# Patient Record
Sex: MALE | URBAN - METROPOLITAN AREA
[De-identification: names, ages, dates, MRNs, and addresses within clinical notes are randomized per-mention and may not be internally consistent; named-entity substitution may affect disease eponyms.]

---

## 2020-10-15 ENCOUNTER — OFFICE VISIT (OUTPATIENT)
Dept: URBAN - METROPOLITAN AREA CLINIC 105 | Facility: CLINIC | Age: 35
End: 2020-10-15

## 2020-10-21 ENCOUNTER — LAB OUTSIDE AN ENCOUNTER (OUTPATIENT)
Dept: URBAN - METROPOLITAN AREA CLINIC 124 | Facility: CLINIC | Age: 35
End: 2020-10-21

## 2020-10-21 ENCOUNTER — OFFICE VISIT (OUTPATIENT)
Dept: URBAN - METROPOLITAN AREA CLINIC 124 | Facility: CLINIC | Age: 35
End: 2020-10-21
Payer: COMMERCIAL

## 2020-10-21 DIAGNOSIS — R19.7 DIARRHEA: ICD-10-CM

## 2020-10-21 DIAGNOSIS — R07.89 ATYPICAL CHEST PAIN: ICD-10-CM

## 2020-10-21 DIAGNOSIS — K58.9 IBS (IRRITABLE BOWEL SYNDROME)-DIARRHEA: ICD-10-CM

## 2020-10-21 DIAGNOSIS — R19.4 CHANGE IN BOWEL HABITS: ICD-10-CM

## 2020-10-21 DIAGNOSIS — R12 HEARTBURN: ICD-10-CM

## 2020-10-21 PROCEDURE — G8427 DOCREV CUR MEDS BY ELIG CLIN: HCPCS | Performed by: INTERNAL MEDICINE

## 2020-10-21 PROCEDURE — G9903 PT SCRN TBCO ID AS NON USER: HCPCS | Performed by: INTERNAL MEDICINE

## 2020-10-21 PROCEDURE — 99204 OFFICE O/P NEW MOD 45 MIN: CPT | Performed by: INTERNAL MEDICINE

## 2020-10-21 PROCEDURE — G8420 CALC BMI NORM PARAMETERS: HCPCS | Performed by: INTERNAL MEDICINE

## 2020-10-21 PROCEDURE — G8482 FLU IMMUNIZE ORDER/ADMIN: HCPCS | Performed by: INTERNAL MEDICINE

## 2020-10-21 RX ORDER — FAMOTIDINE 40 MG/1
1 TABLET AT BEDTIME TABLET, FILM COATED ORAL ONCE A DAY
Qty: 90 | Refills: 1 | OUTPATIENT
Start: 2020-10-21

## 2020-10-21 RX ORDER — RIFAXIMIN 550 MG/1
1 TABLET TABLET ORAL THREE TIMES A DAY
Qty: 42 TABLET | Refills: 0 | OUTPATIENT
Start: 2020-10-21

## 2020-10-21 NOTE — HPI-TODAY'S VISIT:
Oct 2020: hx hemorrhoid banding by dr driscoll - multiple sessions, most recently 1 yr ago. also had colonoscopy 3 yrs ago. chronic GI issues.  Labs 8/5/20 - CBC, CMP, lipid panel, TSH all normal.  Diarrhea X 3 months. this is new. intermittent. loose stools. 1-2 BM per day. usually constipated in past - has tried trulance which was causing diarrhea, also tried amitiza in past. has tried bentyl, levsin SL, amitryptyline. Had stool tests done by dr driscoll which were normal.  No family history of colon cancer / GI malignancies / IBD.  severe heartburn X 3 weeks ago after eating mexican food. excessive salivation. Started doxycyline X recently but off - on since 15 yrs.

## 2020-10-22 ENCOUNTER — LAB OUTSIDE AN ENCOUNTER (OUTPATIENT)
Dept: URBAN - METROPOLITAN AREA CLINIC 25 | Facility: CLINIC | Age: 35
End: 2020-10-22

## 2020-10-26 ENCOUNTER — TELEPHONE ENCOUNTER (OUTPATIENT)
Dept: URBAN - METROPOLITAN AREA CLINIC 92 | Facility: CLINIC | Age: 35
End: 2020-10-26

## 2020-10-26 LAB
CALPROTECTIN, STOOL - QDX: (no result)
FECAL FAT, QUALITATIVE: (no result)
GASTROINTESTINAL PATHOGEN: (no result)
PANCREATICELASTASE ELISA, STOOL: (no result)

## 2020-10-30 ENCOUNTER — TELEPHONE ENCOUNTER (OUTPATIENT)
Dept: URBAN - METROPOLITAN AREA CLINIC 25 | Facility: CLINIC | Age: 35
End: 2020-10-30

## 2020-11-11 ENCOUNTER — OFFICE VISIT (OUTPATIENT)
Dept: URBAN - METROPOLITAN AREA SURGERY CENTER 16 | Facility: SURGERY CENTER | Age: 35
End: 2020-11-11

## 2020-12-09 ENCOUNTER — OFFICE VISIT (OUTPATIENT)
Dept: URBAN - METROPOLITAN AREA SURGERY CENTER 16 | Facility: SURGERY CENTER | Age: 35
End: 2020-12-09
Payer: COMMERCIAL

## 2020-12-09 ENCOUNTER — CLAIMS CREATED FROM THE CLAIM WINDOW (OUTPATIENT)
Dept: URBAN - METROPOLITAN AREA CLINIC 4 | Facility: CLINIC | Age: 35
End: 2020-12-09
Payer: COMMERCIAL

## 2020-12-09 DIAGNOSIS — K63.89 OTHER SPECIFIED DISEASES OF INTESTINE: ICD-10-CM

## 2020-12-09 DIAGNOSIS — R19.7 ACUTE DIARRHEA: ICD-10-CM

## 2020-12-09 PROCEDURE — 88342 IMHCHEM/IMCYTCHM 1ST ANTB: CPT | Performed by: PATHOLOGY

## 2020-12-09 PROCEDURE — 88305 TISSUE EXAM BY PATHOLOGIST: CPT | Performed by: PATHOLOGY

## 2020-12-09 PROCEDURE — 45380 COLONOSCOPY AND BIOPSY: CPT | Performed by: INTERNAL MEDICINE

## 2020-12-09 PROCEDURE — 88313 SPECIAL STAINS GROUP 2: CPT | Performed by: PATHOLOGY

## 2020-12-09 PROCEDURE — G9937 DIG OR SURV COLSCO: HCPCS | Performed by: INTERNAL MEDICINE

## 2020-12-09 PROCEDURE — G8907 PT DOC NO EVENTS ON DISCHARG: HCPCS | Performed by: INTERNAL MEDICINE

## 2020-12-09 RX ORDER — RIFAXIMIN 550 MG/1
1 TABLET TABLET ORAL THREE TIMES A DAY
Qty: 42 TABLET | Refills: 0 | Status: ACTIVE | COMMUNITY
Start: 2020-10-21

## 2020-12-09 RX ORDER — FAMOTIDINE 40 MG/1
1 TABLET AT BEDTIME TABLET, FILM COATED ORAL ONCE A DAY
Qty: 90 | Refills: 1 | Status: ACTIVE | COMMUNITY
Start: 2020-10-21

## 2020-12-09 RX ORDER — PANCRELIPASE LIPASE, PANCRELIPASE PROTEASE, PANCRELIPASE AMYLASE 40000; 126000; 168000 [USP'U]/1; [USP'U]/1; [USP'U]/1
2 CAPSULES WITH MEALS AND 1 WITH SNACK CAPSULE, DELAYED RELEASE ORAL
Qty: 750 | Refills: 2 | OUTPATIENT

## 2020-12-11 ENCOUNTER — TELEPHONE ENCOUNTER (OUTPATIENT)
Dept: URBAN - METROPOLITAN AREA CLINIC 92 | Facility: CLINIC | Age: 35
End: 2020-12-11

## 2021-01-22 ENCOUNTER — OFFICE VISIT (OUTPATIENT)
Dept: URBAN - METROPOLITAN AREA TELEHEALTH 2 | Facility: TELEHEALTH | Age: 36
End: 2021-01-22
Payer: COMMERCIAL

## 2021-01-22 ENCOUNTER — TELEPHONE ENCOUNTER (OUTPATIENT)
Dept: URBAN - METROPOLITAN AREA CLINIC 92 | Facility: CLINIC | Age: 36
End: 2021-01-22

## 2021-01-22 DIAGNOSIS — K58.9 IBS (IRRITABLE BOWEL SYNDROME)-DIARRHEA: ICD-10-CM

## 2021-01-22 DIAGNOSIS — R19.4 CHANGE IN BOWEL HABITS: ICD-10-CM

## 2021-01-22 DIAGNOSIS — R12 HEARTBURN: ICD-10-CM

## 2021-01-22 DIAGNOSIS — R07.89 ATYPICAL CHEST PAIN: ICD-10-CM

## 2021-01-22 PROCEDURE — 99214 OFFICE O/P EST MOD 30 MIN: CPT | Performed by: INTERNAL MEDICINE

## 2021-01-22 PROCEDURE — G8482 FLU IMMUNIZE ORDER/ADMIN: HCPCS | Performed by: INTERNAL MEDICINE

## 2021-01-22 PROCEDURE — G8420 CALC BMI NORM PARAMETERS: HCPCS | Performed by: INTERNAL MEDICINE

## 2021-01-22 PROCEDURE — G8427 DOCREV CUR MEDS BY ELIG CLIN: HCPCS | Performed by: INTERNAL MEDICINE

## 2021-01-22 PROCEDURE — 1036F TOBACCO NON-USER: CPT | Performed by: INTERNAL MEDICINE

## 2021-01-22 PROCEDURE — G9903 PT SCRN TBCO ID AS NON USER: HCPCS | Performed by: INTERNAL MEDICINE

## 2021-01-22 RX ORDER — DICYCLOMINE HYDROCHLORIDE 20 MG/1
1 TABLET TABLET ORAL THREE TIMES A DAY
Qty: 270 TABLET | Refills: 2 | OUTPATIENT
Start: 2021-01-22

## 2021-01-22 RX ORDER — PANCRELIPASE LIPASE, PANCRELIPASE PROTEASE, PANCRELIPASE AMYLASE 40000; 126000; 168000 [USP'U]/1; [USP'U]/1; [USP'U]/1
2 CAPSULES WITH MEALS AND 1 WITH SNACK CAPSULE, DELAYED RELEASE ORAL
Qty: 750 | Refills: 2 | Status: DISCONTINUED | COMMUNITY

## 2021-01-22 RX ORDER — RIFAXIMIN 550 MG/1
1 TABLET TABLET ORAL THREE TIMES A DAY
Qty: 42 TABLET | Refills: 0 | Status: DISCONTINUED | COMMUNITY
Start: 2020-10-21

## 2021-01-22 RX ORDER — FAMOTIDINE 40 MG/1
1 TABLET AT BEDTIME TABLET, FILM COATED ORAL ONCE A DAY
Qty: 90 | Refills: 1 | Status: DISCONTINUED | COMMUNITY
Start: 2020-10-21

## 2021-01-22 NOTE — HPI-TODAY'S VISIT:
Jan 2021  Stool studies in October 2020 revealed moderate pancreatic insufficiency, normal fecal calprotectin, no increase in fecal fat, negative infection panel.  Colonoscopy in October 2020 was completely normal up to TI except somewhat unsatisfactory prep, path revealed no abnormalities in TI or colon.  Labs from August 2020 hemoglobin 14, normal platelet count, normal creatinine, normal LFTs.  Normal thyroid function.  completed xifaxan which didnt help. tried zenpep which also isnt helping. still having diarrhea. 3-6 Bm per day. no formed stools. has tried Imodium which does seem to work. but worried abt taking longterm. abdominal gurgling. symptoms started in july 2020. no relationship with lactose or gluten. no FH of celiac disease. no flushing , rash, joint pains. abdominal cramping relieved by having BM.

## 2021-01-22 NOTE — HPI-OTHER HISTORIES
Oct 2020 :   hx hemorrhoid banding by dr driscoll - multiple sessions, most recently 1 yr ago. also had colonoscopy 3 yrs ago. chronic GI issues.  Labs 8/5/20 - CBC, CMP, lipid panel, TSH all normal. Diarrhea X 3 months. this is new. intermittent. loose stools. 1-2 BM per day. usually constipated in past - has tried trulance which was causing diarrhea, also tried amitiza in past. has tried bentyl, levsin SL, amitryptyline. Had stool tests done by dr driscoll which were normal. No family history of colon cancer / GI malignancies / IBD. severe heartburn X 3 weeks ago after eating mexican food. excessive salivation. Started doxycyline X recently but off - on since 15 yrs.

## 2021-02-24 ENCOUNTER — OFFICE VISIT (OUTPATIENT)
Dept: URBAN - METROPOLITAN AREA CLINIC 25 | Facility: CLINIC | Age: 36
End: 2021-02-24
Payer: COMMERCIAL

## 2021-02-24 DIAGNOSIS — K58.9 IBS (IRRITABLE BOWEL SYNDROME)-DIARRHEA: ICD-10-CM

## 2021-02-24 DIAGNOSIS — R19.4 CHANGE IN BOWEL HABITS: ICD-10-CM

## 2021-02-24 DIAGNOSIS — R12 HEARTBURN: ICD-10-CM

## 2021-02-24 DIAGNOSIS — R07.89 ATYPICAL CHEST PAIN: ICD-10-CM

## 2021-02-24 DIAGNOSIS — R19.7 DIARRHEA: ICD-10-CM

## 2021-02-24 PROCEDURE — 99214 OFFICE O/P EST MOD 30 MIN: CPT | Performed by: INTERNAL MEDICINE

## 2021-02-24 RX ORDER — DICYCLOMINE HYDROCHLORIDE 20 MG/1
1 TABLET TABLET ORAL THREE TIMES A DAY
Qty: 270 TABLET | Refills: 2 | Status: ACTIVE | COMMUNITY
Start: 2021-01-22

## 2021-02-24 NOTE — HPI-TODAY'S VISIT:
feb 2021: using imdoium 3-4 times per day. 3 Loose BM per day. zenpep doesnt work. no rectal bleeding. abdominal rumbling. no pain. no weight loss.

## 2021-02-24 NOTE — HPI-OTHER HISTORIES
Oct 2020 :   hx hemorrhoid banding by dr driscoll - multiple sessions, most recently 1 yr ago. also had colonoscopy 3 yrs ago. chronic GI issues.  Labs 8/5/20 - CBC, CMP, lipid panel, TSH all normal. Diarrhea X 3 months. this is new. intermittent. loose stools. 1-2 BM per day. usually constipated in past - has tried trulance which was causing diarrhea, also tried amitiza in past. has tried bentyl, levsin SL, amitryptyline. Had stool tests done by dr driscoll which were normal. No family history of colon cancer / GI malignancies / IBD. severe heartburn X 3 weeks ago after eating mexican food. excessive salivation. Started doxycyline X recently but off - on since 15 yrs.  Jan 2021  Stool studies in October 2020 revealed moderate pancreatic insufficiency, normal fecal calprotectin, no increase in fecal fat, negative infection panel.  Colonoscopy in October 2020 was completely normal up to TI except somewhat unsatisfactory prep, path revealed no abnormalities in TI or colon.  Labs from August 2020 hemoglobin 14, normal platelet count, normal creatinine, normal LFTs.  Normal thyroid function.  completed xifaxan which didnt help. tried zenpep which also isnt helping. still having diarrhea. 3-6 Bm per day. no formed stools. has tried Imodium which does seem to work. but worried abt taking longterm. abdominal gurgling. symptoms started in july 2020. no relationship with lactose or gluten. no FH of celiac disease. no flushing , rash, joint pains. abdominal cramping relieved by having BM.

## 2021-03-05 ENCOUNTER — OFFICE VISIT (OUTPATIENT)
Dept: URBAN - METROPOLITAN AREA TELEHEALTH 2 | Facility: TELEHEALTH | Age: 36
End: 2021-03-05

## 2021-04-21 ENCOUNTER — OFFICE VISIT (OUTPATIENT)
Dept: URBAN - METROPOLITAN AREA TELEHEALTH 2 | Facility: TELEHEALTH | Age: 36
End: 2021-04-21
Payer: COMMERCIAL

## 2021-04-21 ENCOUNTER — OFFICE VISIT (OUTPATIENT)
Dept: URBAN - METROPOLITAN AREA CLINIC 25 | Facility: CLINIC | Age: 36
End: 2021-04-21

## 2021-04-21 ENCOUNTER — TELEPHONE ENCOUNTER (OUTPATIENT)
Dept: URBAN - METROPOLITAN AREA CLINIC 25 | Facility: CLINIC | Age: 36
End: 2021-04-21

## 2021-04-21 ENCOUNTER — LAB OUTSIDE AN ENCOUNTER (OUTPATIENT)
Dept: URBAN - METROPOLITAN AREA TELEHEALTH 2 | Facility: TELEHEALTH | Age: 36
End: 2021-04-21

## 2021-04-21 ENCOUNTER — TELEPHONE ENCOUNTER (OUTPATIENT)
Dept: URBAN - METROPOLITAN AREA CLINIC 92 | Facility: CLINIC | Age: 36
End: 2021-04-21

## 2021-04-21 DIAGNOSIS — R12 HEARTBURN: ICD-10-CM

## 2021-04-21 DIAGNOSIS — R19.4 CHANGE IN BOWEL HABITS: ICD-10-CM

## 2021-04-21 DIAGNOSIS — K58.0 IRRITABLE BOWEL SYNDROME WITH DIARRHEA: ICD-10-CM

## 2021-04-21 DIAGNOSIS — R07.89 ATYPICAL CHEST PAIN: ICD-10-CM

## 2021-04-21 PROBLEM — 10743008 IRRITABLE BOWEL SYNDROME: Status: ACTIVE | Noted: 2020-10-21

## 2021-04-21 PROCEDURE — 99214 OFFICE O/P EST MOD 30 MIN: CPT | Performed by: INTERNAL MEDICINE

## 2021-04-21 RX ORDER — DICYCLOMINE HYDROCHLORIDE 20 MG/1
1 TABLET TABLET ORAL THREE TIMES A DAY
Qty: 270 TABLET | Refills: 2 | Status: ACTIVE | COMMUNITY
Start: 2021-01-22

## 2021-04-21 RX ORDER — COLESEVELAM HYDROCHLORIDE 625 MG/1
1-2 TABLETS WITH MEALS TABLET, FILM COATED ORAL
Qty: 100 | Refills: 2 | OUTPATIENT
Start: 2021-04-21

## 2021-04-21 NOTE — HPI-TODAY'S VISIT:
April 2021: Tried Viberzi for 3 weeks but did not see ay change in diarrhea. Still looser stools. 1 - 3 BM per day. Depends on what he eats. Denies lactose intolerance. Gasiness, abdominal cramping. Also reports fecal incontinence. No nocturnal symptoms. no prior EGD.  Lab was done in March 2021 revealed a normal hemoglobin of 15, normal CBC, normal liver enzymes, albumin, creatinine, TSH, free T4, no celiac disease

## 2021-04-21 NOTE — HPI-OTHER HISTORIES
feb 2021: using imdoium 3-4 times per day. 3 Loose BM per day. zenpep doesnt work. no rectal bleeding. abdominal rumbling. no pain. no weight loss.  Oct 2020 :   hx hemorrhoid banding by dr driscoll - multiple sessions, most recently 1 yr ago. also had colonoscopy 3 yrs ago. chronic GI issues.  Labs 8/5/20 - CBC, CMP, lipid panel, TSH all normal. Diarrhea X 3 months. this is new. intermittent. loose stools. 1-2 BM per day. usually constipated in past - has tried trulance which was causing diarrhea, also tried amitiza in past. has tried bentyl, levsin SL, amitryptyline. Had stool tests done by dr driscoll which were normal. No family history of colon cancer / GI malignancies / IBD. severe heartburn X 3 weeks ago after eating mexican food. excessive salivation. Started doxycyline X recently but off - on since 15 yrs.  Jan 2021  Stool studies in October 2020 revealed moderate pancreatic insufficiency, normal fecal calprotectin, no increase in fecal fat, negative infection panel.  Colonoscopy in October 2020 was completely normal up to TI except somewhat unsatisfactory prep, path revealed no abnormalities in TI or colon.  Labs from August 2020 hemoglobin 14, normal platelet count, normal creatinine, normal LFTs.  Normal thyroid function.  completed xifaxan which didnt help. tried zenpep which also isnt helping. still having diarrhea. 3-6 Bm per day. no formed stools. has tried Imodium which does seem to work. but worried abt taking longterm. abdominal gurgling. symptoms started in july 2020. no relationship with lactose or gluten. no FH of celiac disease. no flushing , rash, joint pains. abdominal cramping relieved by having BM.

## 2021-08-06 ENCOUNTER — OFFICE VISIT (OUTPATIENT)
Dept: URBAN - METROPOLITAN AREA CLINIC 25 | Facility: CLINIC | Age: 36
End: 2021-08-06

## 2021-08-26 ENCOUNTER — LAB OUTSIDE AN ENCOUNTER (OUTPATIENT)
Dept: URBAN - METROPOLITAN AREA CLINIC 105 | Facility: CLINIC | Age: 36
End: 2021-08-26

## 2021-08-26 ENCOUNTER — OFFICE VISIT (OUTPATIENT)
Dept: URBAN - METROPOLITAN AREA CLINIC 105 | Facility: CLINIC | Age: 36
End: 2021-08-26
Payer: COMMERCIAL

## 2021-08-26 VITALS
TEMPERATURE: 98.3 F | WEIGHT: 173.4 LBS | BODY MASS INDEX: 24.27 KG/M2 | SYSTOLIC BLOOD PRESSURE: 133 MMHG | DIASTOLIC BLOOD PRESSURE: 78 MMHG | HEART RATE: 74 BPM | HEIGHT: 71 IN

## 2021-08-26 DIAGNOSIS — Z87.19 HISTORY OF CONSTIPATION: ICD-10-CM

## 2021-08-26 DIAGNOSIS — K64.8 INTERNAL HEMORRHOIDS: ICD-10-CM

## 2021-08-26 DIAGNOSIS — R19.7 DIARRHEA, UNSPECIFIED: ICD-10-CM

## 2021-08-26 PROCEDURE — 99214 OFFICE O/P EST MOD 30 MIN: CPT | Performed by: INTERNAL MEDICINE

## 2021-08-26 RX ORDER — COLESEVELAM HYDROCHLORIDE 625 MG/1
1-2 TABLETS WITH MEALS TABLET, FILM COATED ORAL
Qty: 100 | Refills: 2 | Status: ACTIVE | COMMUNITY
Start: 2021-04-21

## 2021-08-26 RX ORDER — CYCLOBENZAPRINE HYDROCHLORIDE 5 MG/1
1 TABLET AT BEDTIME AS NEEDED TABLET, FILM COATED ORAL ONCE A DAY
Status: ACTIVE | COMMUNITY

## 2021-08-26 RX ORDER — DICYCLOMINE HYDROCHLORIDE 20 MG/1
1 TABLET TABLET ORAL THREE TIMES A DAY
Qty: 270 TABLET | Refills: 2 | Status: ACTIVE | COMMUNITY
Start: 2021-01-22

## 2021-08-26 NOTE — HPI-TODAY'S VISIT:
The patient presents on follow-up for diarrhea. He was previously seen by Omar Logan MD on 4/21/21.  Today, the patient presents to me for a second opinion. Prior to last July, he had an "iron stomach" in terms of eating anything he wants. Since last July, he has not had a solid BM he states. He has 1-2 BMs QD with watery stools QOD. He had a colon in December, but had a poor prep. He does not remember his bowel habit immediately following his colon. He reports a history of constipation in the distant past - would have a daily hard BM with straining but with good evacuation. He has not taken anything for constipation previously. He previously took Amitiza (rx'd with Dr. Workman) which produced diarrhea. He does not remember trying Trulance, but it is listed in a previous note that he was prescribed it. He notes a history of abdominal pain, but not currently.    Previous medication use includes: Omeprazole 40 mg 11/4/20, Carafate 11/5/20, amitriptyline 10 mg 8/18/20, hyoscyamine .375 mg 9/11/20, Dicyclomine 10 mg 8/1/20, Zenpep 04113 Lipase 1/7/21, Colestipol, Xifaxan.  Labs 3/15/21 - Celiac, hep C Ab all negative. CBC, CMP, TSH/FT4 all normal. 10/22/20 - Pancreatic elastase 153.5 (slight to moderate), GPP stool negative, neutral fat qualitative normal, calprotectin normal.

## 2021-09-30 ENCOUNTER — OFFICE VISIT (OUTPATIENT)
Dept: URBAN - METROPOLITAN AREA TELEHEALTH 2 | Facility: TELEHEALTH | Age: 36
End: 2021-09-30
Payer: COMMERCIAL

## 2021-09-30 ENCOUNTER — OFFICE VISIT (OUTPATIENT)
Dept: URBAN - METROPOLITAN AREA CLINIC 105 | Facility: CLINIC | Age: 36
End: 2021-09-30

## 2021-09-30 DIAGNOSIS — Z87.19 HISTORY OF CONSTIPATION: ICD-10-CM

## 2021-09-30 DIAGNOSIS — K64.8 INTERNAL HEMORRHOIDS: ICD-10-CM

## 2021-09-30 DIAGNOSIS — R19.7 DIARRHEA, UNSPECIFIED: ICD-10-CM

## 2021-09-30 PROCEDURE — 99443 PHONE E/M BY PHYS 21-30 MIN: CPT | Performed by: INTERNAL MEDICINE

## 2021-09-30 RX ORDER — DICYCLOMINE HYDROCHLORIDE 20 MG/1
1 TABLET TABLET ORAL THREE TIMES A DAY
Qty: 270 TABLET | Refills: 2 | Status: ON HOLD | COMMUNITY
Start: 2021-01-22

## 2021-09-30 RX ORDER — COLESEVELAM HYDROCHLORIDE 625 MG/1
1-2 TABLETS WITH MEALS TABLET, FILM COATED ORAL
Qty: 100 | Refills: 2 | Status: ON HOLD | COMMUNITY
Start: 2021-04-21

## 2021-09-30 RX ORDER — CYCLOBENZAPRINE HYDROCHLORIDE 5 MG/1
1 TABLET AT BEDTIME AS NEEDED TABLET, FILM COATED ORAL ONCE A DAY
Status: ACTIVE | COMMUNITY

## 2021-09-30 NOTE — HPI-TODAY'S VISIT:
The patient presents on follow-up for diarrhea. He was previously seen by Omar Logan MD on 4/21/21.  On 8/26/21, the patient presented to me for a second opinion. Prior to last July, he had an "iron stomach" in terms of eating anything he wants. Since last July, he had not had a solid BM he stated. He had 1-2 BMs QD with watery stools QOD. He had a colon in December, but had a poor prep. He did not remember his bowel habit immediately following his colon. He reported a history of constipation in the distant past - would have a daily hard BM with straining but with good evacuation. He had not taken anything for constipation previously. He previously took Amitiza (rx'd with Dr. Workman) which produced diarrhea. He did not remember trying Trulance, but it was listed in a previous note that he was prescribed it. He noted a history of abdominal pain, but not currently.    Previous medication use includes: Omeprazole 40 mg 11/4/20, Carafate 11/5/20, amitriptyline 10 mg 8/18/20, hyoscyamine .375 mg 9/11/20, Dicyclomine 10 mg 8/1/20, Zenpep 55100 Lipase 1/7/21, Colestipol, Xifaxan.  ADDENDUM: Abdominal x-ray showed a large amount of stool throughout the patient's colon consistent with constipation. Will call the patient and discuss a bowel regimen and a follow-up appt. in 4-6 weeks.  Labs 3/15/21 - Celiac, hep C Ab all negative. CBC, CMP, TSH/FT4 all normal. 10/22/20 - Pancreatic elastase 153.5 (slight to moderate), GPP stool negative, neutral fat qualitative normal, calprotectin normal.

## 2021-09-30 NOTE — HPI-TODAY'S VISIT:
The patient presents on follow-up for diarrhea. He was previously seen by Omar Logan MD on 4/21/21.  On 8/26/21, the patient presented to me for a second opinion. Prior to last July, he had an "iron stomach" in terms of eating anything he wants. Since last July, he had not had a solid BM he stated. He had 1-2 BMs QD with watery stools QOD. He had a colon in December, but had a poor prep. He did not remember his bowel habit immediately following his colon. He reported a history of constipation in the distant past - would have a daily hard BM with straining but with good evacuation. He had not taken anything for constipation previously. He previously took Amitiza (rx'd with Dr. Workman) which produced diarrhea. He did not remember trying Trulance, but it was listed in a previous note that he was prescribed it. He noted a history of abdominal pain, but not currently.    Previous medication use includes: Omeprazole 40 mg 11/4/20, Carafate 11/5/20, amitriptyline 10 mg 8/18/20, hyoscyamine .375 mg 9/11/20, Dicyclomine 10 mg 8/1/20, Zenpep 16436 Lipase 1/7/21, Colestipol, Xifaxan.  Abdominal x-ray showed a large amount of stool throughout the patient's colon consistent with constipation.   Today, he says he started on Miralax 1 cap QD.  He has 1-2 BMs QD. Stools are watery QOD. He does not feel like he gets complete evacuation - stool are medium volume. 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  Labs 3/15/21 - Celiac, hep C Ab all negative. CBC, CMP, TSH/FT4 all normal. 10/22/20 - Pancreatic elastase 153.5 (slight to moderate), GPP stool negative, neutral fat qualitative normal, calprotectin normal.

## 2021-11-16 ENCOUNTER — OFFICE VISIT (OUTPATIENT)
Dept: URBAN - METROPOLITAN AREA CLINIC 105 | Facility: CLINIC | Age: 36
End: 2021-11-16
Payer: COMMERCIAL

## 2021-11-16 ENCOUNTER — LAB OUTSIDE AN ENCOUNTER (OUTPATIENT)
Dept: URBAN - METROPOLITAN AREA CLINIC 105 | Facility: CLINIC | Age: 36
End: 2021-11-16

## 2021-11-16 VITALS
DIASTOLIC BLOOD PRESSURE: 81 MMHG | TEMPERATURE: 97.9 F | HEART RATE: 63 BPM | BODY MASS INDEX: 24.98 KG/M2 | SYSTOLIC BLOOD PRESSURE: 126 MMHG | HEIGHT: 71 IN | WEIGHT: 178.4 LBS

## 2021-11-16 DIAGNOSIS — K64.8 INTERNAL HEMORRHOIDS: ICD-10-CM

## 2021-11-16 DIAGNOSIS — R19.7 DIARRHEA, UNSPECIFIED: ICD-10-CM

## 2021-11-16 DIAGNOSIS — Z87.19 HISTORY OF CONSTIPATION: ICD-10-CM

## 2021-11-16 PROCEDURE — 99214 OFFICE O/P EST MOD 30 MIN: CPT | Performed by: INTERNAL MEDICINE

## 2021-11-16 RX ORDER — DICYCLOMINE HYDROCHLORIDE 20 MG/1
1 TABLET TABLET ORAL THREE TIMES A DAY
Qty: 270 TABLET | Refills: 2 | Status: ON HOLD | COMMUNITY
Start: 2021-01-22

## 2021-11-16 RX ORDER — CYCLOBENZAPRINE HYDROCHLORIDE 5 MG/1
1 TABLET AT BEDTIME AS NEEDED TABLET, FILM COATED ORAL ONCE A DAY
Status: ACTIVE | COMMUNITY

## 2021-11-16 RX ORDER — COLESEVELAM HYDROCHLORIDE 625 MG/1
1-2 TABLETS WITH MEALS TABLET, FILM COATED ORAL
Qty: 100 | Refills: 2 | Status: ON HOLD | COMMUNITY
Start: 2021-04-21

## 2021-11-16 NOTE — HPI-TODAY'S VISIT:
The patient presents on follow-up for diarrhea. He was previously seen by Omar Logan MD on 4/21/21.  On 8/26/21, the patient presented to me for a second opinion. Prior to last July, he had an "iron stomach" in terms of eating anything he wants. Since last July, he had not had a solid BM he stated. He had 1-2 BMs QD with watery stools QOD. He had a colon in December, but had a poor prep. He did not remember his bowel habit immediately following his colon. He reported a history of constipation in the distant past - would have a daily hard BM with straining but with good evacuation. He had not taken anything for constipation previously. He previously took Amitiza (rx'd with Dr. Workman) which produced diarrhea. He did not remember trying Trulance, but it was listed in a previous note that he was prescribed it. He noted a history of abdominal pain, but not currently.    Previous medication use includes: Omeprazole 40 mg 11/4/20, Carafate 11/5/20, amitriptyline 10 mg 8/18/20, hyoscyamine .375 mg 9/11/20, Dicyclomine 10 mg 8/1/20, Zenpep 13298 Lipase 1/7/21, Colestipol, Xifaxan.  Abdominal x-ray showed a large amount of stool throughout the patient's colon consistent with constipation.   On 9/30/21, he said he started on Miralax 1 cap QD.  He had 1-2 BMs QD. Stools were watery QOD. He did not feel like he got complete evacuation - stools were medium volume. 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 He has not had a solid BM in a year. He denies rectal bleeding.   Labs 3/15/21 - Celiac, hep C Ab all negative. CBC, CMP, TSH/FT4 all normal. 10/22/20 - Pancreatic elastase 153.5 (slight to moderate), GPP stool negative, neutral fat qualitative normal, calprotectin normal.

## 2021-11-20 LAB — GASTROINTESTINAL PATHOGEN: (no result)

## 2021-12-21 ENCOUNTER — OFFICE VISIT (OUTPATIENT)
Dept: URBAN - METROPOLITAN AREA CLINIC 105 | Facility: CLINIC | Age: 36
End: 2021-12-21

## 2022-01-05 ENCOUNTER — OFFICE VISIT (OUTPATIENT)
Dept: URBAN - METROPOLITAN AREA CLINIC 105 | Facility: CLINIC | Age: 37
End: 2022-01-05
Payer: COMMERCIAL

## 2022-01-05 VITALS
SYSTOLIC BLOOD PRESSURE: 122 MMHG | TEMPERATURE: 97.1 F | DIASTOLIC BLOOD PRESSURE: 83 MMHG | HEIGHT: 71 IN | WEIGHT: 178 LBS | HEART RATE: 80 BPM | BODY MASS INDEX: 24.92 KG/M2

## 2022-01-05 DIAGNOSIS — R19.7 DIARRHEA, UNSPECIFIED: ICD-10-CM

## 2022-01-05 DIAGNOSIS — K64.8 INTERNAL HEMORRHOIDS: ICD-10-CM

## 2022-01-05 DIAGNOSIS — Z87.19 HISTORY OF CONSTIPATION: ICD-10-CM

## 2022-01-05 PROCEDURE — 99214 OFFICE O/P EST MOD 30 MIN: CPT | Performed by: INTERNAL MEDICINE

## 2022-01-05 RX ORDER — DICYCLOMINE HYDROCHLORIDE 20 MG/1
1 TABLET TABLET ORAL THREE TIMES A DAY
Qty: 270 TABLET | Refills: 2 | Status: DISCONTINUED | COMMUNITY
Start: 2021-01-22

## 2022-01-05 RX ORDER — CYCLOBENZAPRINE HYDROCHLORIDE 5 MG/1
1 TABLET AT BEDTIME AS NEEDED TABLET, FILM COATED ORAL ONCE A DAY
Status: DISCONTINUED | COMMUNITY

## 2022-01-05 RX ORDER — COLESEVELAM HYDROCHLORIDE 625 MG/1
1-2 TABLETS WITH MEALS TABLET, FILM COATED ORAL
Qty: 100 | Refills: 2 | Status: DISCONTINUED | COMMUNITY
Start: 2021-04-21

## 2022-01-05 NOTE — HPI-TODAY'S VISIT:
The patient presents on follow-up for diarrhea. He was previously seen by Omar Logan MD on 4/21/21.  On 8/26/21, the patient presented to me for a second opinion. Prior to last July, he had an "iron stomach" in terms of eating anything he wants. Since last July, he had not had a solid BM he stated. He had 1-2 BMs QD with watery stools QOD. He had a colon in December, but had a poor prep. He did not remember his bowel habit immediately following his colon. He reported a history of constipation in the distant past - would have a daily hard BM with straining but with good evacuation. He had not taken anything for constipation previously. He previously took Amitiza (rx'd with Dr. Workman) which produced diarrhea. He did not remember trying Trulance, but it was listed in a previous note that he was prescribed it. He noted a history of abdominal pain, but not currently.    Previous medication use includes: Omeprazole 40 mg 11/4/20, Carafate 11/5/20, amitriptyline 10 mg 8/18/20, hyoscyamine .375 mg 9/11/20, Dicyclomine 10 mg 8/1/20, Zenpep 05107 Lipase 1/7/21, Colestipol, Xifaxan.  Abdominal x-ray showed a large amount of stool throughout the patient's colon consistent with constipation.   On 9/30/21, he said he started on Miralax 1 cap QD.  He had 1-2 BMs QD. Stools were watery QOD. He did not feel like he got complete evacuation - stools were medium volume. 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 He had not had a solid BM in a year. He denied rectal bleeding.  ADDENDUM: Abdominal x-ray showed a moderate amount of stool throughout the colon c/w constipation. He will do a "mild constipation" colonoscopy bowel prep and then will follow his bowel habit to see if his diarrhea resolves for a period of time.  Today, he says his BMs were watery for 3 days following his bowel prep, which then started to solidify on the 4th day. Currently, his stools are soft ("yogurt" consistency). During the holidays, he went 3 days without a BM - felt more distention/pain - and took Miralax. Following the holidays, he has been having 2-4 BMs QD with incomplete evacuation. Following his bowel prep, he says his bowel habit is unchanged from before (says that before, his avg was 4-5 in a day). His ideal bowel habit is 1-2 solid BMs QD with complete evacuation. He did not feel evacuated on Miralax 1 cap QD. He trialed Linzess previously (given samples by Dr. Workman) which worsened his diarrhea he states. He says he initially trialed a higher dose, then put on a lower one.   He notes less diarrhea on the lower dose of Linzess.  Labs 11/16/21 - Stool study negative.  3/15/21 - Celiac, hep C Ab all negative. CBC, CMP, TSH/FT4 all normal. 10/22/20 - Pancreatic elastase 153.5 (slight to moderate), GPP stool negative, neutral fat qualitative normal, calprotectin normal.

## 2022-01-26 ENCOUNTER — LAB OUTSIDE AN ENCOUNTER (OUTPATIENT)
Dept: URBAN - METROPOLITAN AREA CLINIC 105 | Facility: CLINIC | Age: 37
End: 2022-01-26

## 2022-01-26 ENCOUNTER — OFFICE VISIT (OUTPATIENT)
Dept: URBAN - METROPOLITAN AREA CLINIC 105 | Facility: CLINIC | Age: 37
End: 2022-01-26
Payer: COMMERCIAL

## 2022-01-26 VITALS
SYSTOLIC BLOOD PRESSURE: 127 MMHG | HEIGHT: 71 IN | HEART RATE: 59 BPM | WEIGHT: 186 LBS | BODY MASS INDEX: 26.04 KG/M2 | DIASTOLIC BLOOD PRESSURE: 73 MMHG | TEMPERATURE: 97.2 F

## 2022-01-26 DIAGNOSIS — K64.8 INTERNAL HEMORRHOIDS: ICD-10-CM

## 2022-01-26 DIAGNOSIS — R19.7 DIARRHEA, UNSPECIFIED: ICD-10-CM

## 2022-01-26 DIAGNOSIS — K59.00 CONSTIPATION, UNSPECIFIED CONSTIPATION TYPE: ICD-10-CM

## 2022-01-26 PROCEDURE — 99214 OFFICE O/P EST MOD 30 MIN: CPT | Performed by: INTERNAL MEDICINE

## 2022-01-26 NOTE — HPI-TODAY'S VISIT:
The patient presents on follow-up for diarrhea. He was previously seen by Omar Logan MD on 4/21/21.  On 8/26/21, the patient presented to me for a second opinion. Prior to last July, he had an "iron stomach" in terms of eating anything he wants. Since last July, he had not had a solid BM he stated. He had 1-2 BMs QD with watery stools QOD. He had a colon in December, but had a poor prep. He did not remember his bowel habit immediately following his colon. He reported a history of constipation in the distant past - would have a daily hard BM with straining but with good evacuation. He had not taken anything for constipation previously. He previously took Amitiza (rx'd with Dr. Workman) which produced diarrhea. He did not remember trying Trulance, but it was listed in a previous note that he was prescribed it. He noted a history of abdominal pain, but not currently.    Previous medication use includes: Omeprazole 40 mg 11/4/20, Carafate 11/5/20, amitriptyline 10 mg 8/18/20, hyoscyamine .375 mg 9/11/20, Dicyclomine 10 mg 8/1/20, Zenpep 38440 Lipase 1/7/21, Colestipol, Xifaxan.  Abdominal x-ray showed a large amount of stool throughout the patient's colon consistent with constipation.   On 9/30/21, he said he started on Miralax 1 cap QD.  He had 1-2 BMs QD. Stools were watery QOD. He did not feel like he got complete evacuation - stools were medium volume. 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 He had not had a solid BM in a year. He denied rectal bleeding.  ADDENDUM: Abdominal x-ray showed a moderate amount of stool throughout the colon c/w constipation. He will do a "mild constipation" colonoscopy bowel prep and then will follow his bowel habit to see if his diarrhea resolves for a period of time.  On 1/5/22, he said his BMs were watery for 3 days following his bowel prep, which then started to solidify on the 4th day. Currently, his stools were soft ("yogurt" consistency). During the holidays, he went 3 days without a BM - felt more distention/pain - and took Miralax. Following the holidays, he had been having 2-4 BMs QD with incomplete evacuation. Following his bowel prep, he said his bowel habit was unchanged from before (said that before, his avg was 4-5 in a day). His ideal bowel habit was 1-2 solid BMs QD with complete evacuation. He did not feel evacuated on Miralax 1 cap QD. He trialed Linzess previously (given samples by Dr. Workman) which worsened his diarrhea he stated. He said he initially trialed a higher dose, then was put on a lower one. He noted less diarrhea on the lower dose of Linzess.  Today, he says he took Linzess 72 mcg QOD for the first week of use, but in the past few days has increased to QD. He currently has a soft, not watery BM QAM without coffee. When he was on the QOD dose, he had 1 soft-serve BM w/ incomplete evacuation and could skip a day - evacuation did not improve on a QD regimen. He usually takes Linzess after dinner and has his next BM the following morning at around 9:30-10 am. He is severely bloated 1-2x/week without a pattern. Bloating is not as bad right now.   Labs 11/16/21 - Stool study negative.  3/15/21 - Celiac, hep C Ab all negative. CBC, CMP, TSH/FT4 all normal. 10/22/20 - Pancreatic elastase 153.5 (slight to moderate), GPP stool negative, neutral fat qualitative normal, calprotectin normal.

## 2022-03-02 ENCOUNTER — OFFICE VISIT (OUTPATIENT)
Dept: URBAN - METROPOLITAN AREA CLINIC 105 | Facility: CLINIC | Age: 37
End: 2022-03-02

## 2022-03-09 ENCOUNTER — TELEPHONE ENCOUNTER (OUTPATIENT)
Dept: URBAN - METROPOLITAN AREA CLINIC 105 | Facility: CLINIC | Age: 37
End: 2022-03-09

## 2022-03-14 ENCOUNTER — TELEPHONE ENCOUNTER (OUTPATIENT)
Dept: URBAN - METROPOLITAN AREA CLINIC 105 | Facility: CLINIC | Age: 37
End: 2022-03-14

## 2022-04-04 NOTE — PHYSICAL EXAM CHEST:
, breathing is unlabored without accessory muscle use,normal breath sounds
Intervent Radiology
Electrophysiology
Urology
Hepatology
Nephrology
Cardiology
Heme/Onc
Wound Care
MICU

## 2022-04-18 ENCOUNTER — TELEPHONE ENCOUNTER (OUTPATIENT)
Dept: URBAN - METROPOLITAN AREA CLINIC 105 | Facility: CLINIC | Age: 37
End: 2022-04-18

## 2022-04-20 ENCOUNTER — OFFICE VISIT (OUTPATIENT)
Dept: URBAN - METROPOLITAN AREA MEDICAL CENTER 28 | Facility: MEDICAL CENTER | Age: 37
End: 2022-04-20

## 2022-09-16 ENCOUNTER — OFFICE VISIT (OUTPATIENT)
Dept: URBAN - METROPOLITAN AREA TELEHEALTH 2 | Facility: TELEHEALTH | Age: 37
End: 2022-09-16
Payer: COMMERCIAL

## 2022-09-16 ENCOUNTER — LAB OUTSIDE AN ENCOUNTER (OUTPATIENT)
Dept: URBAN - METROPOLITAN AREA TELEHEALTH 2 | Facility: TELEHEALTH | Age: 37
End: 2022-09-16

## 2022-09-16 VITALS — HEIGHT: 71 IN

## 2022-09-16 DIAGNOSIS — K64.8 INTERNAL HEMORRHOIDS: ICD-10-CM

## 2022-09-16 DIAGNOSIS — R10.32 LLQ ABDOMINAL PAIN: ICD-10-CM

## 2022-09-16 DIAGNOSIS — R19.7 DIARRHEA, UNSPECIFIED: ICD-10-CM

## 2022-09-16 DIAGNOSIS — K59.00 CONSTIPATION, UNSPECIFIED CONSTIPATION TYPE: ICD-10-CM

## 2022-09-16 DIAGNOSIS — R10.12 LUQ ABDOMINAL PAIN: ICD-10-CM

## 2022-09-16 PROCEDURE — 99214 OFFICE O/P EST MOD 30 MIN: CPT | Performed by: INTERNAL MEDICINE

## 2022-09-16 NOTE — HPI-TODAY'S VISIT:
The patient presents on follow-up for diarrhea. He was previously seen by Omar Logan MD on 4/21/21.  On 8/26/21, the patient presented to me for a second opinion. Prior to last July, he had an "iron stomach" in terms of eating anything he wants. Since last July, he had not had a solid BM he stated. He had 1-2 BMs QD with watery stools QOD. He had a colon in December, but had a poor prep. He did not remember his bowel habit immediately following his colon. He reported a history of constipation in the distant past - would have a daily hard BM with straining but with good evacuation. He had not taken anything for constipation previously. He previously took Amitiza (rx'd with Dr. Workman) which produced diarrhea. He did not remember trying Trulance, but it was listed in a previous note that he was prescribed it. He noted a history of abdominal pain, but not currently.    Previous medication use includes: Omeprazole 40 mg 11/4/20, Carafate 11/5/20, amitriptyline 10 mg 8/18/20, hyoscyamine .375 mg 9/11/20, Dicyclomine 10 mg 8/1/20, Zenpep [id number] Lipase 1/7/21, Colestipol, Xifaxan.  Abdominal x-ray showed a large amount of stool throughout the patient's colon consistent with constipation.   On 9/30/21, he said he started on Miralax 1 cap QD.  He had 1-2 BMs QD. Stools were watery QOD. He did not feel like he got complete evacuation - stools were medium volume. 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 He had not had a solid BM in a year. He denied rectal bleeding.  ADDENDUM: Abdominal x-ray showed a moderate amount of stool throughout the colon c/w constipation. He will do a "mild constipation" colonoscopy bowel prep and then will follow his bowel habit to see if his diarrhea resolves for a period of time.  On 1/5/22, he said his BMs were watery for 3 days following his bowel prep, which then started to solidify on the 4th day. Currently, his stools were soft ("yogurt" consistency). During the holidays, he went 3 days without a BM - felt more distention/pain - and took Miralax. Following the holidays, he had been having 2-4 BMs QD with incomplete evacuation. Following his bowel prep, he said his bowel habit was unchanged from before (said that before, his avg was 4-5 in a day). His ideal bowel habit was 1-2 solid BMs QD with complete evacuation. He did not feel evacuated on Miralax 1 cap QD. He trialed Linzess previously (given samples by Dr. Workman) which worsened his diarrhea he stated. He said he initially trialed a higher dose, then was put on a lower one. He noted less diarrhea on the lower dose of Linzess.  On 1/26/22, he said he took Linzess 72 mcg QOD for the first week of use, but in the past few days had increased to QD. He currently had a soft, not watery BM QAM without coffee. When he was on the QOD dose, he had 1 soft-serve BM w/ incomplete evacuation and could skip a day - evacuation did not improve on a QD regimen. He usually took Linzess after dinner and had his next BM the following morning at around 9:30-10 am. He was severely bloated 1-2x/week without a pattern. Bloating was not as bad right now.  HPI: Today, he says he has tapered off Linzess because he wanted to get off all medications. On Linzess 145 mcg QD, he had 1 soft BM QD without any episodes of watery diarrhea - preferred a more formed BM. He last took Linzess 2 months ago. Now, he has 1 more-formed BM QD - bowel habit is almost back to normal.   He reports a new symptom of abdominal pain - onset 2.5-3 weeks ago. Pain is left subcostal and LLQ. Pain is constant and exacerbated by certain foods. He does note that stress can also exacerbate pain. 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  Labs 11/16/21 - Stool study negative.  3/15/21 - Celiac, hep C Ab all negative. CBC, CMP, TSH/FT4 all normal. 10/22/20 - Pancreatic elastase 153.5 (slight to moderate), GPP stool negative, neutral fat qualitative normal, calprotectin normal.

## 2022-09-17 LAB
A/G RATIO: 2.2
ABSOLUTE BASOPHILS: 37
ABSOLUTE EOSINOPHILS: 252
ABSOLUTE LYMPHOCYTES: 2538
ABSOLUTE MONOCYTES: 496
ABSOLUTE NEUTROPHILS: 4077
ALBUMIN: 4.8
ALKALINE PHOSPHATASE: 52
ALT (SGPT): 15
AST (SGOT): 19
BASOPHILS: 0.5
BILIRUBIN, TOTAL: 0.5
BUN/CREATININE RATIO: (no result)
BUN: 20
CALCIUM: 9.7
CARBON DIOXIDE, TOTAL: 29
CHLORIDE: 104
CREATININE: 0.81
EGFR: 116
EOSINOPHILS: 3.4
GLOBULIN, TOTAL: 2.2
GLUCOSE: 89
HEMATOCRIT: 40.1
HEMOGLOBIN: 13.8
LIPASE: 45
LYMPHOCYTES: 34.3
MCH: 29.2
MCHC: 34.4
MCV: 84.8
MONOCYTES: 6.7
MPV: 10.1
NEUTROPHILS: 55.1
PLATELET COUNT: 279
POTASSIUM: 4.3
PROTEIN, TOTAL: 7
RDW: 12.4
RED BLOOD CELL COUNT: 4.73
SODIUM: 142
WHITE BLOOD CELL COUNT: 7.4

## 2022-10-26 ENCOUNTER — OFFICE VISIT (OUTPATIENT)
Dept: URBAN - METROPOLITAN AREA CLINIC 105 | Facility: CLINIC | Age: 37
End: 2022-10-26
Payer: COMMERCIAL

## 2022-10-26 ENCOUNTER — WEB ENCOUNTER (OUTPATIENT)
Dept: URBAN - METROPOLITAN AREA CLINIC 105 | Facility: CLINIC | Age: 37
End: 2022-10-26

## 2022-10-26 VITALS
DIASTOLIC BLOOD PRESSURE: 74 MMHG | HEART RATE: 70 BPM | HEIGHT: 71 IN | TEMPERATURE: 97.9 F | SYSTOLIC BLOOD PRESSURE: 118 MMHG | BODY MASS INDEX: 24.92 KG/M2 | WEIGHT: 178 LBS

## 2022-10-26 DIAGNOSIS — R10.32 LLQ ABDOMINAL PAIN: ICD-10-CM

## 2022-10-26 DIAGNOSIS — R10.12 LUQ ABDOMINAL PAIN: ICD-10-CM

## 2022-10-26 DIAGNOSIS — K59.00 CONSTIPATION, UNSPECIFIED CONSTIPATION TYPE: ICD-10-CM

## 2022-10-26 DIAGNOSIS — R19.7 DIARRHEA, UNSPECIFIED: ICD-10-CM

## 2022-10-26 DIAGNOSIS — K64.8 INTERNAL HEMORRHOIDS: ICD-10-CM

## 2022-10-26 PROCEDURE — 99213 OFFICE O/P EST LOW 20 MIN: CPT | Performed by: INTERNAL MEDICINE

## 2022-10-26 NOTE — HPI-TODAY'S VISIT:
The patient presents on follow-up for diarrhea. He was previously seen by Omar Logan MD on 4/21/21.  On 8/26/21, the patient presented to me for a second opinion. Prior to last July, he had an "iron stomach" in terms of eating anything he wants. Since last July, he had not had a solid BM he stated. He had 1-2 BMs QD with watery stools QOD. He had a colon in December, but had a poor prep. He did not remember his bowel habit immediately following his colon. He reported a history of constipation in the distant past - would have a daily hard BM with straining but with good evacuation. He had not taken anything for constipation previously. He previously took Amitiza (rx'd with Dr. Workman) which produced diarrhea. He did not remember trying Trulance, but it was listed in a previous note that he was prescribed it. He noted a history of abdominal pain, but not currently.    Previous medication use includes: Omeprazole 40 mg 11/4/20, Carafate 11/5/20, amitriptyline 10 mg 8/18/20, hyoscyamine .375 mg 9/11/20, Dicyclomine 10 mg 8/1/20, Zenpep [id number] Lipase 1/7/21, Colestipol, Xifaxan.  Abdominal x-ray showed a large amount of stool throughout the patient's colon consistent with constipation.   On 9/30/21, he said he started on Miralax 1 cap QD.  He had 1-2 BMs QD. Stools were watery QOD. He did not feel like he got complete evacuation - stools were medium volume. 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 He had not had a solid BM in a year. He denied rectal bleeding.  ADDENDUM: Abdominal x-ray showed a moderate amount of stool throughout the colon c/w constipation. He will do a "mild constipation" colonoscopy bowel prep and then will follow his bowel habit to see if his diarrhea resolves for a period of time.  On 1/5/22, he said his BMs were watery for 3 days following his bowel prep, which then started to solidify on the 4th day. Currently, his stools were soft ("yogurt" consistency). During the holidays, he went 3 days without a BM - felt more distention/pain - and took Miralax. Following the holidays, he had been having 2-4 BMs QD with incomplete evacuation. Following his bowel prep, he said his bowel habit was unchanged from before (said that before, his avg was 4-5 in a day). His ideal bowel habit was 1-2 solid BMs QD with complete evacuation. He did not feel evacuated on Miralax 1 cap QD. He trialed Linzess previously (given samples by Dr. Workman) which worsened his diarrhea he stated. He said he initially trialed a higher dose, then was put on a lower one. He noted less diarrhea on the lower dose of Linzess.  On 1/26/22, he said he took Linzess 72 mcg QOD for the first week of use, but in the past few days had increased to QD. He currently had a soft, not watery BM QAM without coffee. When he was on the QOD dose, he had 1 soft-serve BM w/ incomplete evacuation and could skip a day - evacuation did not improve on a QD regimen. He usually took Linzess after dinner and had his next BM the following morning at around 9:30-10 am. He was severely bloated 1-2x/week without a pattern. Bloating was not as bad right now.  On 9/16/22, he said he had tapered off Linzess because he wanted to get off all medications. On Linzess 145 mcg QD, he had 1 soft BM QD without any episodes of watery diarrhea - preferred a more formed BM. He last took Linzess 2 months ago. Now, he had 1 more-formed BM QD - bowel habit was almost back to normal.   He reported a new symptom of abdominal pain - onset 2.5-3 weeks ago. Pain was left subcostal and LLQ. Pain was constant and exacerbated by certain foods. He did note that stress could also exacerbate pain. 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  CT A/P w/ contrast 10/11/22 was unremarkable.  HPI Today the patient states he trial Linzess 145 mcg daily to increase evactuation to see if his abdominal pain improved.  It did not.  He tapered off Linzess.  He notes now he is having a BM daily with initermittent strain and incomplete evacuation.  He states his bowel movements are fine.  He trialed hyoscyamine with no benefit.    Labs 9/16/22 - CBC, CMP, lipase all normal. 11/16/21 - Stool study negative.  3/15/21 - Celiac, hep C Ab all negative. CBC, CMP, TSH/FT4 all normal. 10/22/20 - Pancreatic elastase 153.5 (slight to moderate), GPP stool negative, neutral fat qualitative normal, calprotectin normal.

## 2022-10-30 ENCOUNTER — DASHBOARD ENCOUNTERS (OUTPATIENT)
Age: 37
End: 2022-10-30

## 2022-10-30 PROBLEM — 14760008: Status: ACTIVE | Noted: 2022-01-26
